# Patient Record
Sex: FEMALE | URBAN - METROPOLITAN AREA
[De-identification: names, ages, dates, MRNs, and addresses within clinical notes are randomized per-mention and may not be internally consistent; named-entity substitution may affect disease eponyms.]

---

## 2023-03-22 ENCOUNTER — OFFICE VISIT (OUTPATIENT)
Dept: URBAN - METROPOLITAN AREA CLINIC 63 | Facility: CLINIC | Age: 38
End: 2023-03-22

## 2023-03-22 NOTE — HPI-TODAY'S VISIT:
Katy is a pleasant 37-year-old female who presents today for evaluation of nausea, vomiting and abdominal pain